# Patient Record
Sex: MALE | Race: WHITE | HISPANIC OR LATINO | URBAN - METROPOLITAN AREA
[De-identification: names, ages, dates, MRNs, and addresses within clinical notes are randomized per-mention and may not be internally consistent; named-entity substitution may affect disease eponyms.]

---

## 2023-11-27 ENCOUNTER — HOSPITAL ENCOUNTER (OUTPATIENT)
Dept: RADIOLOGY | Facility: CLINIC | Age: 33
Discharge: HOME | End: 2023-11-27
Payer: COMMERCIAL

## 2023-11-27 ENCOUNTER — OFFICE VISIT (OUTPATIENT)
Dept: URGENT CARE | Facility: CLINIC | Age: 33
End: 2023-11-27
Payer: COMMERCIAL

## 2023-11-27 VITALS
BODY MASS INDEX: 26.66 KG/M2 | SYSTOLIC BLOOD PRESSURE: 126 MMHG | WEIGHT: 180 LBS | TEMPERATURE: 97.7 F | HEART RATE: 58 BPM | OXYGEN SATURATION: 98 % | DIASTOLIC BLOOD PRESSURE: 74 MMHG | HEIGHT: 69 IN | RESPIRATION RATE: 12 BRPM

## 2023-11-27 DIAGNOSIS — R07.81 RIB PAIN ON RIGHT SIDE: Primary | ICD-10-CM

## 2023-11-27 PROCEDURE — 71101 X-RAY EXAM UNILAT RIBS/CHEST: CPT | Mod: RT

## 2023-11-27 PROCEDURE — 99204 OFFICE O/P NEW MOD 45 MIN: CPT | Performed by: PHYSICIAN ASSISTANT

## 2023-11-27 PROCEDURE — 71101 X-RAY EXAM UNILAT RIBS/CHEST: CPT | Mod: RIGHT SIDE | Performed by: RADIOLOGY

## 2023-11-27 PROCEDURE — 1036F TOBACCO NON-USER: CPT | Performed by: PHYSICIAN ASSISTANT

## 2023-11-27 RX ORDER — CROMOLYN SODIUM 40 MG/ML
1 SOLUTION/ DROPS OPHTHALMIC 4 TIMES DAILY
COMMUNITY
Start: 2021-05-27

## 2023-11-27 RX ORDER — DICLOFENAC SODIUM 50 MG/1
50 TABLET, DELAYED RELEASE ORAL 2 TIMES DAILY
Qty: 20 TABLET | Refills: 0 | Status: SHIPPED | OUTPATIENT
Start: 2023-11-27 | End: 2023-12-07

## 2023-11-27 RX ORDER — CETIRIZINE HYDROCHLORIDE 10 MG/1
1 TABLET ORAL DAILY
COMMUNITY

## 2023-11-27 RX ORDER — IPRATROPIUM BROMIDE 21 UG/1
2 SPRAY, METERED NASAL 2 TIMES DAILY
COMMUNITY
Start: 2021-05-27

## 2023-11-27 ASSESSMENT — PAIN SCALES - GENERAL: PAINLEVEL: 7

## 2023-11-27 NOTE — PROGRESS NOTES
"Subjective   Patient ID: Wan Mohamud is a 32 y.o. male who presents for Rib Injury (Injury to Right upper ribs from soccer collision with another player x5 days.).  Patient notes pain at the right sided anterior ribs just under the right pectoral with deep breathing since the incident.  He denies any shortness of breath or chest pain outside of this area.  Denies any difficulty breathing.  Denies any fevers or chills or productive cough.    Past Medical History:   Diagnosis Date    Personal history of other specified conditions 07/23/2019    History of syncope         The remainder of the systems were reviewed and are negative unless noted above      Objective   /74   Pulse 58   Temp 36.5 °C (97.7 °F) (Temporal)   Resp 12   Ht 1.753 m (5' 9\")   Wt 81.6 kg (180 lb)   SpO2 98%   BMI 26.58 kg/m²   Physical Exam  Constitutional:       General: He is not in acute distress.     Appearance: Normal appearance. He is not ill-appearing, toxic-appearing or diaphoretic.   HENT:      Head: Normocephalic and atraumatic.      Mouth/Throat:      Mouth: Mucous membranes are moist.      Pharynx: Oropharynx is clear.   Eyes:      Conjunctiva/sclera: Conjunctivae normal.   Cardiovascular:      Rate and Rhythm: Normal rate and regular rhythm.      Heart sounds: No murmur heard.  Pulmonary:      Effort: Pulmonary effort is normal. No respiratory distress.      Breath sounds: Normal breath sounds. No wheezing.      Comments: Right-sided anterior rib tenderness just under the right pectoral.  No flail chest.  Chest wall excursion is symmetrical lungs are clear to auscultation bilaterally  Chest:      Chest wall: Tenderness present.   Musculoskeletal:         General: No swelling, tenderness, deformity or signs of injury. Normal range of motion.      Cervical back: Normal range of motion and neck supple.   Skin:     General: Skin is warm and dry.      Findings: No erythema or rash.   Neurological:      General: No focal " deficit present.      Mental Status: He is alert and oriented to person, place, and time.      Gait: Gait normal.         Assessment/Plan   Problem List Items Addressed This Visit       Rib pain on right side - Primary    Relevant Medications    diclofenac (Voltaren) 50 mg EC tablet    Other Relevant Orders    XR ribs right 2 views w chest anteroposterior        STUDY:  Right Rib and Chest Radiographs; 11/27/2023 5:39 PM   INDICATION:  Right anterior rib pain status post trauma/injury.  COMPARISON:  XR chest 08/20/2022.    ACCESSION NUMBER(S):  [id number]  ORDERING CLINICIAN:  YUNIOR KENT  TECHNIQUE:  Frontal chest and two view(s) of the right ribs.  FINDINGS:    CARDIOMEDIASTINAL SILHOUETTE:  Cardiomediastinal silhouette is normal in size and configuration.     LUNGS:  Lungs are clear.     ABDOMEN:  No remarkable upper abdominal findings.  RIGHT RIBS:  There is no acute rib fracture.    OTHER VISUALIZED BONES:  No acute osseous changes.  IMPRESSION:  No displaced rib fractures.  No acute cardiopulmonary disease.  Signed by Jose L Estevez DO

## 2024-02-15 ENCOUNTER — OFFICE VISIT (OUTPATIENT)
Dept: URGENT CARE | Facility: CLINIC | Age: 34
End: 2024-02-15
Payer: COMMERCIAL

## 2024-02-15 ENCOUNTER — HOSPITAL ENCOUNTER (OUTPATIENT)
Dept: RADIOLOGY | Facility: CLINIC | Age: 34
Discharge: HOME | End: 2024-02-15
Payer: COMMERCIAL

## 2024-02-15 VITALS
DIASTOLIC BLOOD PRESSURE: 70 MMHG | TEMPERATURE: 97.8 F | SYSTOLIC BLOOD PRESSURE: 132 MMHG | OXYGEN SATURATION: 98 % | RESPIRATION RATE: 18 BRPM | HEIGHT: 69 IN | HEART RATE: 57 BPM | BODY MASS INDEX: 26.07 KG/M2 | WEIGHT: 176 LBS

## 2024-02-15 DIAGNOSIS — M25.541 JOINT PAIN IN FINGERS OF RIGHT HAND: ICD-10-CM

## 2024-02-15 DIAGNOSIS — M25.541 JOINT PAIN IN FINGERS OF RIGHT HAND: Primary | ICD-10-CM

## 2024-02-15 PROCEDURE — 1036F TOBACCO NON-USER: CPT | Performed by: PHYSICIAN ASSISTANT

## 2024-02-15 PROCEDURE — 73140 X-RAY EXAM OF FINGER(S): CPT | Mod: RIGHT SIDE | Performed by: RADIOLOGY

## 2024-02-15 PROCEDURE — 99214 OFFICE O/P EST MOD 30 MIN: CPT | Performed by: PHYSICIAN ASSISTANT

## 2024-02-15 PROCEDURE — 73140 X-RAY EXAM OF FINGER(S): CPT | Mod: RT

## 2024-02-15 ASSESSMENT — ENCOUNTER SYMPTOMS
EYES NEGATIVE: 1
ENDOCRINE NEGATIVE: 1
ALLERGIC/IMMUNOLOGIC NEGATIVE: 1
RESPIRATORY NEGATIVE: 1
WEAKNESS: 0
CONSTITUTIONAL NEGATIVE: 1
CARDIOVASCULAR NEGATIVE: 1
GASTROINTESTINAL NEGATIVE: 1
PSYCHIATRIC NEGATIVE: 1
HEMATOLOGIC/LYMPHATIC NEGATIVE: 1
JOINT SWELLING: 0
ARTHRALGIAS: 1
NUMBNESS: 0

## 2024-02-15 ASSESSMENT — PAIN SCALES - GENERAL: PAINLEVEL: 0-NO PAIN

## 2024-02-15 NOTE — PATIENT INSTRUCTIONS
Motrin or aleve as directed for pain  Orthopedic follow up for continued pain  ER visit anytime 24/7 for acute worsening or changing condition

## 2024-02-16 NOTE — PROGRESS NOTES
"Subjective   Patient ID: Wan Mohamud is a 33 y.o. male.      History provided by:  Patient   used: No    This is a 33 yr old male here for right 2nd finger pain. Pain in the MCP joint region for 3 months after played golf and \"hit the ground\" with the club. No jt swelling, skin color change, open wound, distal parasthesias, weakness or inability to move the jt fully.    Review of Systems   Constitutional: Negative.    HENT: Negative.     Eyes: Negative.    Respiratory: Negative.     Cardiovascular: Negative.    Gastrointestinal: Negative.    Endocrine: Negative.    Genitourinary: Negative.    Musculoskeletal:  Positive for arthralgias. Negative for joint swelling.   Skin: Negative.    Allergic/Immunologic: Negative.    Neurological:  Negative for weakness and numbness.   Hematological: Negative.    Psychiatric/Behavioral: Negative.     All other systems reviewed and are negative.  /70 (BP Location: Right arm, Patient Position: Sitting, BP Cuff Size: Adult)   Pulse 57   Temp 36.6 °C (97.8 °F) (Temporal)   Resp 18   Ht 1.753 m (5' 9\")   Wt 79.8 kg (176 lb)   SpO2 98%   BMI 25.99 kg/m²     Objective   Physical Exam  Vitals and nursing note reviewed.   Constitutional:       Appearance: Normal appearance.   HENT:      Head: Normocephalic and atraumatic.   Cardiovascular:      Rate and Rhythm: Normal rate and regular rhythm.   Pulmonary:      Effort: Pulmonary effort is normal.      Breath sounds: Normal breath sounds.   Musculoskeletal:      Comments: Right index finger-pain with palpation over MCP jt region,  no jt swelling, FROM, distal n-v intact. No overlying skin erythema or open wound   Skin:     General: Skin is warm and dry.   Neurological:      General: No focal deficit present.      Mental Status: He is alert and oriented to person, place, and time.   Psychiatric:         Mood and Affect: Mood normal.         Behavior: Behavior normal.     Assessment:  Joint pain in fingers of " right hand    Plan:  Finger xray negative per radiology  OTC NSAID rx as directed  Orthopedic referral placed  ER visit anytime 24/7 for acute worsening or changing condition

## 2024-03-18 ENCOUNTER — OFFICE VISIT (OUTPATIENT)
Dept: ORTHOPEDIC SURGERY | Facility: CLINIC | Age: 34
End: 2024-03-18
Payer: COMMERCIAL

## 2024-03-18 VITALS — WEIGHT: 175 LBS | HEIGHT: 69 IN | BODY MASS INDEX: 25.92 KG/M2

## 2024-03-18 DIAGNOSIS — M25.541 JOINT PAIN IN FINGERS OF RIGHT HAND: ICD-10-CM

## 2024-03-18 PROCEDURE — 1036F TOBACCO NON-USER: CPT | Performed by: ORTHOPAEDIC SURGERY

## 2024-03-18 PROCEDURE — 99203 OFFICE O/P NEW LOW 30 MIN: CPT | Performed by: ORTHOPAEDIC SURGERY

## 2024-03-18 NOTE — PROGRESS NOTES
Subjective    Patient ID: Wan Mohamud is a 33 y.o. male.    Chief Complaint: Pain of the Right Hand (X6 MONTHS/WAS PLAYING GOLF, WENT TO SWING AND JAMMED HIS FINGER)     Last Surgery: No surgery found  Last Surgery Date: No surgery found    FILOMENA  Is a 33-year-old right-handed dominant male who comes in with a 6-month history of pain in his right second MCP joint.  The pain began after he was golfing.  He had a shot awkwardly and the handle of the golf club hit him directly in the right hand.  He sustained essentially a ulnar deviation injury at the second MCP joint.  He continued to play however.  He was using ibuprofen.  Due to continued symptoms however he did have x-rays obtained within the past 2 months.  He comes in today since the symptoms have not completely resolved.    Objective   Ortho Exam  Patient is in no acute distress.  Exam of his right hand and wrist reveals a skin flap is intact.  He has no swelling at the second MCP joint.  He is full range of motion his right index finger.  He has just mild tenderness over the radial aspect of the second MCP joint line.  There was no laxity with stress testing of the RCL or UCL of the second MCP joint.    Image Results:  XR fingers right 2+ views  Narrative: Interpreted By:  Andria Garnica,   STUDY:  XR FINGERS RIGHT 2+ VIEWS;  2/15/2024 5:10 pm      INDICATION:  Signs/Symptoms:2nd finger MCP jt pain after golf injury.  Presented DX Codes:  M25.541 Pain in joints of right hand      COMPARISON:  None.      ACCESSION NUMBER(S):  [id number]      ORDERING CLINICIAN:  SANDOR SIMMS      FINDINGS:  3 views of the right 2nd finger were obtained.      There is no acute fracture or dislocation.   The soft tissues are  unremarkable.      Impression: 1.  No radiographic evidence for acute fracture at the right 2nd  finger.              MACRO:  None.      Signed by: Andria Garnica 2/15/2024 6:42 PM  Dictation workstation:   [id number]      Assessment/Plan    Encounter Diagnoses:  Joint pain in fingers of right hand    Patient may have sustained a sprain of his right second MCP joint RCL which then became chronically irritated with overuse.  I also explained to the patient that depending on the impact he may also have bruised the articular cartilage.  However at this time explained to the patient he may use his right hand is much as his symptoms allow.  He will follow-up as his symptoms dictate.